# Patient Record
Sex: FEMALE | URBAN - METROPOLITAN AREA
[De-identification: names, ages, dates, MRNs, and addresses within clinical notes are randomized per-mention and may not be internally consistent; named-entity substitution may affect disease eponyms.]

---

## 2019-10-10 ENCOUNTER — HOSPITAL ENCOUNTER (OUTPATIENT)
Dept: LAB | Age: 69
Discharge: HOME | End: 2019-10-10

## 2019-10-10 PROCEDURE — 88312 SPECIAL STAINS GROUP 1: CPT

## 2019-10-10 PROCEDURE — 88305 TISSUE EXAM BY PATHOLOGIST: CPT

## 2021-08-28 ENCOUNTER — TELEPHONE ENCOUNTER (OUTPATIENT)
Dept: URBAN - METROPOLITAN AREA CLINIC 13 | Facility: CLINIC | Age: 71
End: 2021-08-28

## 2021-08-29 ENCOUNTER — TELEPHONE ENCOUNTER (OUTPATIENT)
Dept: URBAN - METROPOLITAN AREA CLINIC 13 | Facility: CLINIC | Age: 71
End: 2021-08-29

## 2024-05-31 ENCOUNTER — TELEPHONE (OUTPATIENT)
Dept: ORTHOPEDIC SURGERY | Age: 74
End: 2024-05-31

## 2024-05-31 NOTE — TELEPHONE ENCOUNTER
Can  MEF see this  pt  next week?  She is  getting  her  CT  from  [facility]     She   fell on  5/22 and  has  a shattered  elbow   and  a tear and has been told  she  is  surgical   Also  she has  a forearm fx     I  grabbed  a spot  w/ fan  later in  June  and  she needs  to  get  her  CT   But  can  Friend  see  her  Tuesday or  Thursday  of  next week?

## 2024-06-04 ENCOUNTER — OFFICE VISIT (OUTPATIENT)
Age: 74
End: 2024-06-04
Payer: COMMERCIAL

## 2024-06-04 VITALS — WEIGHT: 157 LBS | BODY MASS INDEX: 24.64 KG/M2 | HEIGHT: 67 IN

## 2024-06-04 DIAGNOSIS — S52.122A CLOSED DISPLACED FRACTURE OF HEAD OF LEFT RADIUS, INITIAL ENCOUNTER: ICD-10-CM

## 2024-06-04 DIAGNOSIS — S42.452A CLOSED FRACTURE OF CAPITULUM OF LEFT HUMERUS, INITIAL ENCOUNTER: Primary | ICD-10-CM

## 2024-06-04 PROCEDURE — 99205 OFFICE O/P NEW HI 60 MIN: CPT | Performed by: ORTHOPAEDIC SURGERY

## 2024-06-04 PROCEDURE — 1123F ACP DISCUSS/DSCN MKR DOCD: CPT | Performed by: ORTHOPAEDIC SURGERY

## 2024-06-04 NOTE — H&P (VIEW-ONLY)
Orthopaedic Hand Clinic Note    Name: Yany Mcdonough  YOB: 1950  Gender: female  MRN: [id number]      CC: Patient referred for evaluation of upper extremity pain    HPI: Yany Mcdonough is a 73 y.o. female with a chief complaint of left elbow injury which occurred on 5/22/24 as a result of a fall. [facility] were obtained, and she was placed in a splint. She followed up with another orthopedist, who referred her here for further care. .      ROS/Meds/PSH/PMH/FH/SH: I personally reviewed the patients standard intake form.  Pertinents are discussed in the HPI    Physical Examination:    Musculoskeletal Exam:  Examination on the left upper extremity demonstrates cap refill < 5 seconds in all fingers, there is swelling, ecchymosis throughout the left arm extending to the hand, there is tenderness at the elbow. Elbow motion was not assessed. Finger range of motion is limited. Light touch sensation is intact throughout.    Imaging / Electrodiagnostic Tests:     I independently reviewed and interpreted left elbow radiographs.  They demonstrate displaced capitellar and radial head fractures, as well as a small fracture of the coronoid tip    I independently reviewed and interpreted left elbow CT.  They demonstrate again displaced and comminuted capitellar fracture, which extends into the trochlea.  There is a displaced fracture of the radial head, which appears to involve a fairly small portion of the articular surface. There is a coronoid tip fracture        Assessment:     ICD-10-CM    1. Closed fracture of capitulum of left humerus, initial encounter  S42.452A Case Request     Ambulatory referral to Occupational Therapy      2. Closed displaced fracture of head of left radius, initial encounter  S52.122A Case Request     Ambulatory referral to Occupational Therapy          Plan:   We discussed the diagnosis and different treatment options. We discussed observation, therapy, antiinflammatory

## 2024-06-04 NOTE — PROGRESS NOTES
Orthopaedic Hand Clinic Note    Name: Yany Mcdonough  YOB: 1950  Gender: female  MRN: [id number]      CC: Patient referred for evaluation of upper extremity pain    HPI: Yany Mcdonough is a 73 y.o. female with a chief complaint of left elbow injury which occurred on 5/22/24 as a result of a fall. [facility] were obtained, and she was placed in a splint. She followed up with another orthopedist, who referred her here for further care. .      ROS/Meds/PSH/PMH/FH/SH: I personally reviewed the patients standard intake form.  Pertinents are discussed in the HPI    Physical Examination:    Musculoskeletal Exam:  Examination on the left upper extremity demonstrates cap refill < 5 seconds in all fingers, there is swelling, ecchymosis throughout the left arm extending to the hand, there is tenderness at the elbow. Elbow motion was not assessed. Finger range of motion is limited. Light touch sensation is intact throughout.    Imaging / Electrodiagnostic Tests:     I independently reviewed and interpreted left elbow radiographs.  They demonstrate displaced capitellar and radial head fractures, as well as a small fracture of the coronoid tip    I independently reviewed and interpreted left elbow CT.  They demonstrate again displaced and comminuted capitellar fracture, which extends into the trochlea.  There is a displaced fracture of the radial head, which appears to involve a fairly small portion of the articular surface. There is a coronoid tip fracture        Assessment:     ICD-10-CM    1. Closed fracture of capitulum of left humerus, initial encounter  S42.452A Case Request     Ambulatory referral to Occupational Therapy      2. Closed displaced fracture of head of left radius, initial encounter  S52.122A Case Request     Ambulatory referral to Occupational Therapy          Plan:   We discussed the diagnosis and different treatment options. We discussed observation, therapy, antiinflammatory

## 2024-06-05 ENCOUNTER — TELEPHONE (OUTPATIENT)
Dept: ORTHOPEDIC SURGERY | Age: 74
End: 2024-06-05

## 2024-06-05 PROBLEM — S42.452A: Status: ACTIVE | Noted: 2024-06-04

## 2024-06-05 PROBLEM — S52.122A CLOSED FRACTURE OF HEAD OF LEFT RADIUS: Status: ACTIVE | Noted: 2024-06-04

## 2024-06-05 NOTE — TELEPHONE ENCOUNTER
She was seen yesterday and set up surgery for Friday. She has 2 quick questions. This was a voicemail.

## 2024-06-06 RX ORDER — ZINC SULFATE 50(220)MG
220 CAPSULE ORAL DAILY
COMMUNITY
End: 2024-06-06

## 2024-06-06 RX ORDER — LISINOPRIL 20 MG/1
1 TABLET ORAL DAILY
COMMUNITY
Start: 2019-12-13

## 2024-06-06 RX ORDER — TIOTROPIUM BROMIDE 18 UG/1
18 CAPSULE ORAL; RESPIRATORY (INHALATION) DAILY
COMMUNITY
Start: 2022-02-10

## 2024-06-06 RX ORDER — MELOXICAM 7.5 MG/1
7.5 TABLET ORAL PRN
COMMUNITY

## 2024-06-06 RX ORDER — CETIRIZINE HYDROCHLORIDE 10 MG/1
1 TABLET ORAL DAILY PRN
COMMUNITY

## 2024-06-06 NOTE — PERIOP NOTE
Preop department called to notify patient of arrival time for scheduled procedure. Instructions given to   - Arrive at [facility] Entr[facility] [address].  - Remain NPO after midnight, unless otherwise indicated, including gum, mints, and ice chips.   - Have a responsible adult to drive patient to the hospital, stay during surgery, and patient will need supervision 24 hours after anesthesia.   - Use antibacterial soap in shower the night before surgery and on the morning of surgery.       Was patient contacted: yes  Voicemail left:   Numbers contacted: [phone]   Arrival time: 0930

## 2024-06-06 NOTE — PERIOP NOTE
Phone pre-assessment completed.    Verified name & . Order to obtain consent not found in EHR, however patient verifies case posting.    Type 1B surgery,  assessment complete.  Orders not received.    Labs per surgeon: unknown  Labs per anesthesia protocol: none    Patient answered medical/surgical history questions at their best of ability. All prior to admission medications documented in EPIC.    Patient instructed to continue all prescribed medications unless otherwise instructed below:    Prescription meds to hold: meloxicam- hold immediately.     Please stop all vitamins & supplements 7 days prior to surgery and stop all NSAIDS (ASA/Excedrin/BC & Goody Powder, ibuprofen/Motrin/Advil, naproxen/Aleve) 5 days before your surgery. Should you have a surgery date that does not allow for the amount of time instructed above, please stop taking vitamins, supplements, and NSAIDS IMMEDIATELY.    Patient instructed to take ONLY the following medications day of surgery per anesthesia guidelines with sip of water: none. Please use Spiriva inhaler and bring albuterol inhaler to the hospital.Continue all prescription medication the day/night prior to surgery unless other wise instructed above.    If you have never been diagnosed with liver disease, take Acetaminophen 1000mg in the morning and then again before bed one day prior to surgery date. You may substitute Tylenol Regular Strength.    Instructed on the following:    > Arrive at [address] [place] [address] ([address])[place] [address]   Entrance, time of arrival to be called the day before by 1700  > NPO after midnight including gum, mints, and ice chips & NO TOBACCO  > Responsible adult must drive patient to the hospital, stay during surgery, and patient will need supervision 24 hours after anesthesia  > Use antibacterial soap in shower the night before surgery and on the morning of surgery  > All piercings must be removed prior to arrival.    > Leave all valuables (money

## 2024-06-07 ENCOUNTER — ANESTHESIA (OUTPATIENT)
Dept: SURGERY | Age: 74
End: 2024-06-07
Payer: MEDICARE

## 2024-06-07 ENCOUNTER — HOSPITAL ENCOUNTER (OUTPATIENT)
Age: 74
Setting detail: OUTPATIENT SURGERY
Discharge: HOME | End: 2024-06-07
Attending: ORTHOPAEDIC SURGERY | Admitting: ORTHOPAEDIC SURGERY
Payer: MEDICARE

## 2024-06-07 ENCOUNTER — ANESTHESIA EVENT (OUTPATIENT)
Dept: SURGERY | Age: 74
End: 2024-06-07
Payer: MEDICARE

## 2024-06-07 ENCOUNTER — APPOINTMENT (OUTPATIENT)
Dept: GENERAL RADIOLOGY | Age: 74
End: 2024-06-07
Attending: ORTHOPAEDIC SURGERY
Payer: MEDICARE

## 2024-06-07 VITALS
DIASTOLIC BLOOD PRESSURE: 77 MMHG | TEMPERATURE: 97.9 F | WEIGHT: 157 LBS | OXYGEN SATURATION: 93 % | HEIGHT: 67 IN | SYSTOLIC BLOOD PRESSURE: 147 MMHG | BODY MASS INDEX: 24.64 KG/M2 | HEART RATE: 68 BPM | RESPIRATION RATE: 14 BRPM

## 2024-06-07 DIAGNOSIS — S52.122A CLOSED DISPLACED FRACTURE OF HEAD OF LEFT RADIUS, INITIAL ENCOUNTER: ICD-10-CM

## 2024-06-07 DIAGNOSIS — S42.452A CLOSED DISPLACED FRACTURE OF LATERAL CONDYLE OF LEFT HUMERUS, INITIAL ENCOUNTER: Primary | ICD-10-CM

## 2024-06-07 PROCEDURE — 24665 OPTX RADIAL HEAD/NECK FX: CPT | Performed by: ORTHOPAEDIC SURGERY

## 2024-06-07 PROCEDURE — 3600000014 HC SURGERY LEVEL 4 ADDTL 15MIN: Performed by: ORTHOPAEDIC SURGERY

## 2024-06-07 PROCEDURE — 6360000002 HC RX W HCPCS: Performed by: ORTHOPAEDIC SURGERY

## 2024-06-07 PROCEDURE — 7100000000 HC PACU RECOVERY - FIRST 15 MIN: Performed by: ORTHOPAEDIC SURGERY

## 2024-06-07 PROCEDURE — 24579 OPTX HUMRL CNDYLR FRACTURE: CPT | Performed by: ORTHOPAEDIC SURGERY

## 2024-06-07 PROCEDURE — 3700000001 HC ADD 15 MINUTES (ANESTHESIA): Performed by: ORTHOPAEDIC SURGERY

## 2024-06-07 PROCEDURE — C1769 GUIDE WIRE: HCPCS | Performed by: ORTHOPAEDIC SURGERY

## 2024-06-07 PROCEDURE — 76942 ECHO GUIDE FOR BIOPSY: CPT | Performed by: ANESTHESIOLOGY

## 2024-06-07 PROCEDURE — 3600000004 HC SURGERY LEVEL 4 BASE: Performed by: ORTHOPAEDIC SURGERY

## 2024-06-07 PROCEDURE — 6360000002 HC RX W HCPCS: Performed by: ANESTHESIOLOGY

## 2024-06-07 PROCEDURE — 2580000003 HC RX 258: Performed by: ANESTHESIOLOGY

## 2024-06-07 PROCEDURE — 2709999900 HC NON-CHARGEABLE SUPPLY: Performed by: ORTHOPAEDIC SURGERY

## 2024-06-07 PROCEDURE — C1713 ANCHOR/SCREW BN/BN,TIS/BN: HCPCS | Performed by: ORTHOPAEDIC SURGERY

## 2024-06-07 PROCEDURE — 7100000010 HC PHASE II RECOVERY - FIRST 15 MIN: Performed by: ORTHOPAEDIC SURGERY

## 2024-06-07 PROCEDURE — 2720000010 HC SURG SUPPLY STERILE: Performed by: ORTHOPAEDIC SURGERY

## 2024-06-07 PROCEDURE — 7100000001 HC PACU RECOVERY - ADDTL 15 MIN: Performed by: ORTHOPAEDIC SURGERY

## 2024-06-07 PROCEDURE — 6360000002 HC RX W HCPCS: Performed by: NURSE ANESTHETIST, CERTIFIED REGISTERED

## 2024-06-07 PROCEDURE — 3700000000 HC ANESTHESIA ATTENDED CARE: Performed by: ORTHOPAEDIC SURGERY

## 2024-06-07 DEVICE — IMPLANTABLE DEVICE: Type: IMPLANTABLE DEVICE | Site: ARM | Status: FUNCTIONAL

## 2024-06-07 RX ORDER — HYDROMORPHONE HYDROCHLORIDE 2 MG/ML
0.5 INJECTION, SOLUTION INTRAMUSCULAR; INTRAVENOUS; SUBCUTANEOUS EVERY 5 MIN PRN
Status: DISCONTINUED | OUTPATIENT
Start: 2024-06-07 | End: 2024-06-07 | Stop reason: HOSPADM

## 2024-06-07 RX ORDER — FENTANYL CITRATE 50 UG/ML
100 INJECTION, SOLUTION INTRAMUSCULAR; INTRAVENOUS
Status: COMPLETED | OUTPATIENT
Start: 2024-06-07 | End: 2024-06-07

## 2024-06-07 RX ORDER — PROPOFOL 10 MG/ML
INJECTION, EMULSION INTRAVENOUS PRN
Status: DISCONTINUED | OUTPATIENT
Start: 2024-06-07 | End: 2024-06-07 | Stop reason: SDUPTHER

## 2024-06-07 RX ORDER — SODIUM CHLORIDE 0.9 % (FLUSH) 0.9 %
5-40 SYRINGE (ML) INJECTION EVERY 12 HOURS SCHEDULED
Status: DISCONTINUED | OUTPATIENT
Start: 2024-06-07 | End: 2024-06-07 | Stop reason: HOSPADM

## 2024-06-07 RX ORDER — NALOXONE HYDROCHLORIDE 0.4 MG/ML
INJECTION, SOLUTION INTRAMUSCULAR; INTRAVENOUS; SUBCUTANEOUS PRN
Status: DISCONTINUED | OUTPATIENT
Start: 2024-06-07 | End: 2024-06-07 | Stop reason: HOSPADM

## 2024-06-07 RX ORDER — MIDAZOLAM HYDROCHLORIDE 5 MG/5ML
4 INJECTION, SOLUTION INTRAMUSCULAR; INTRAVENOUS
Status: COMPLETED | OUTPATIENT
Start: 2024-06-07 | End: 2024-06-07

## 2024-06-07 RX ORDER — SODIUM CHLORIDE 0.9 % (FLUSH) 0.9 %
5-40 SYRINGE (ML) INJECTION PRN
Status: DISCONTINUED | OUTPATIENT
Start: 2024-06-07 | End: 2024-06-07 | Stop reason: HOSPADM

## 2024-06-07 RX ORDER — DEXAMETHASONE SODIUM PHOSPHATE 4 MG/ML
INJECTION, SOLUTION INTRA-ARTICULAR; INTRALESIONAL; INTRAMUSCULAR; INTRAVENOUS; SOFT TISSUE
Status: COMPLETED | OUTPATIENT
Start: 2024-06-07 | End: 2024-06-07

## 2024-06-07 RX ORDER — OXYCODONE HYDROCHLORIDE 5 MG/1
5 TABLET ORAL
Status: DISCONTINUED | OUTPATIENT
Start: 2024-06-07 | End: 2024-06-07 | Stop reason: HOSPADM

## 2024-06-07 RX ORDER — SODIUM CHLORIDE 9 MG/ML
INJECTION, SOLUTION INTRAVENOUS PRN
Status: DISCONTINUED | OUTPATIENT
Start: 2024-06-07 | End: 2024-06-07 | Stop reason: HOSPADM

## 2024-06-07 RX ORDER — OXYCODONE HYDROCHLORIDE AND ACETAMINOPHEN 5; 325 MG/1; MG/1
1 TABLET ORAL EVERY 4 HOURS PRN
Qty: 30 TABLET | Refills: 0 | Status: SHIPPED | OUTPATIENT
Start: 2024-06-07 | End: 2024-06-14

## 2024-06-07 RX ORDER — LIDOCAINE HYDROCHLORIDE 10 MG/ML
1 INJECTION, SOLUTION INFILTRATION; PERINEURAL
Status: DISCONTINUED | OUTPATIENT
Start: 2024-06-07 | End: 2024-06-07 | Stop reason: HOSPADM

## 2024-06-07 RX ORDER — SODIUM CHLORIDE, SODIUM LACTATE, POTASSIUM CHLORIDE, CALCIUM CHLORIDE 600; 310; 30; 20 MG/100ML; MG/100ML; MG/100ML; MG/100ML
INJECTION, SOLUTION INTRAVENOUS CONTINUOUS
Status: DISCONTINUED | OUTPATIENT
Start: 2024-06-07 | End: 2024-06-07 | Stop reason: HOSPADM

## 2024-06-07 RX ORDER — ONDANSETRON 2 MG/ML
4 INJECTION INTRAMUSCULAR; INTRAVENOUS
Status: DISCONTINUED | OUTPATIENT
Start: 2024-06-07 | End: 2024-06-07 | Stop reason: HOSPADM

## 2024-06-07 RX ADMIN — SODIUM CHLORIDE, SODIUM LACTATE, POTASSIUM CHLORIDE, AND CALCIUM CHLORIDE: 600; 310; 30; 20 INJECTION, SOLUTION INTRAVENOUS at 15:07

## 2024-06-07 RX ADMIN — EPINEPHRINE 15 ML: 1 INJECTION, SOLUTION, CONCENTRATE INTRAVENOUS at 11:23

## 2024-06-07 RX ADMIN — SODIUM CHLORIDE, SODIUM LACTATE, POTASSIUM CHLORIDE, AND CALCIUM CHLORIDE: 600; 310; 30; 20 INJECTION, SOLUTION INTRAVENOUS at 10:30

## 2024-06-07 RX ADMIN — PROPOFOL 25 MG: 10 INJECTION, EMULSION INTRAVENOUS at 13:23

## 2024-06-07 RX ADMIN — Medication 2000 MG: at 13:16

## 2024-06-07 RX ADMIN — PROPOFOL 15 MG: 10 INJECTION, EMULSION INTRAVENOUS at 13:25

## 2024-06-07 RX ADMIN — MIDAZOLAM 3 MG: 1 INJECTION INTRAMUSCULAR; INTRAVENOUS at 11:23

## 2024-06-07 RX ADMIN — FENTANYL CITRATE 50 MCG: 50 INJECTION INTRAMUSCULAR; INTRAVENOUS at 11:23

## 2024-06-07 RX ADMIN — ROPIVACAINE HYDROCHLORIDE 15 ML: 10 INJECTION, SOLUTION EPIDURAL at 11:23

## 2024-06-07 RX ADMIN — DEXAMETHASONE SODIUM PHOSPHATE 4 MG: 4 INJECTION, SOLUTION INTRAMUSCULAR; INTRAVENOUS at 11:23

## 2024-06-07 RX ADMIN — PROPOFOL 100 MCG/KG/MIN: 10 INJECTION, EMULSION INTRAVENOUS at 13:27

## 2024-06-07 ASSESSMENT — PAIN - FUNCTIONAL ASSESSMENT: PAIN_FUNCTIONAL_ASSESSMENT: 0-10

## 2024-06-07 NOTE — ANESTHESIA PRE PROCEDURE
Department of Anesthesiology  Preprocedure Note       Name:  Yany Mcdonough   Age:  73 y.o.  :  1950                                          MRN:  [id number]         Date:  2024      Surgeon: Surgeon(s):  Kamlesh, Johanna CHENG MD    Procedure: Procedure(s):  left distal HUMERUS and left radial head OPEN REDUCTION INTERNAL FIXATION    Medications prior to admission:   Prior to Admission medications    Medication Sig Start Date End Date Taking? Authorizing Provider   tiotropium (SPIRIVA HANDIHALER) 18 MCG inhalation capsule Inhale 1 capsule into the lungs daily 2/10/22  Yes Ruby Andrew MD   lisinopril (PRINIVIL;ZESTRIL) 20 MG tablet Take 1 tablet by mouth daily 19  Yes Ruby Andrew MD   albuterol sulfate (PROAIR RESPICLICK) 108 (90 Base) MCG/ACT aerosol powder inhalation Inhale 2 puffs into the lungs every 6 hours as needed for Wheezing or Shortness of Breath 17  Yes Ruby Andrew MD   ZINC PO Take by mouth daily   Yes Ruby Andrew MD   Omega-3 Fatty Acids (OMEGA 3 PO) Take 1 tablet by mouth in the morning and at bedtime   Yes Ruby Andrew MD   CHOLINE PO Take 1 tablet by mouth in the morning and at bedtime   Yes Ruby Andrew MD   Vitamin D-Vitamin K (K2 PLUS D3 PO) Take 1 tablet by mouth daily   Yes Ruby Andrew MD   Nutritional Supplements (JUICE PLUS FIBRE PO) Take 1 tablet by mouth daily   Yes Ruby Andrew MD   alpha1-proteinase inhibitor, human, (PROTEINASE INHIBITOR) 1000 MG SOLR Infuse intravenously every 7 days 20  Yes Ruby Andrew MD   Taurine 1000 MG CAPS Take 1,000 mg by mouth daily    Ruby Andrew MD   meloxicam (MOBIC) 7.5 MG tablet Take 1 tablet by mouth as needed for Pain    Ruby Andrew MD   cetirizine (ZYRTEC ALLERGY) 10 MG tablet Take 1 tablet by mouth daily as needed    Ruby Andrew MD       Current medications:    Current Facility-Administered Medications   Medication Dose

## 2024-06-07 NOTE — OP NOTE
ORTHOPAEDIC SURGICAL NOTE        Yany Mcdonough female 73 y.o.  [id number]   06/07/24    PRE-OP DIAGNOSIS: left type IV capitellum fracture, left radial head fracture  POST-OP DIAGNOSIS: Same  LATERALITY: Left     PROCEDURES PERFORMED:   Open reduction with internal fixation of left capitellum fracture  Open reduction with internal fixation of left radial head fracture       SURGEON:   Johanna Whitlock MD     IMPLANTS:   Implant Name Type Inv. Item Serial No.  Lot No. LRB No. Used Action   SCREW BNE SHT THRD 2X22 MM 4 MM YVONNE HDLSS TI - [id number]  SCREW BNE SHT THRD 2X22 MM 4 MM YVONNE HDLSS TI  [employer] [id number] Left 1 Implanted   SCREW BNE SHT THRD 2.5X24 MM 6 MM YVONNE HDLSS TI - [id number]  SCREW BNE SHT THRD 2.5X24 MM 6 MM YVONNE HDLSS TI  [employer] [id number] Left 1 Implanted   SCREW BNE SHT THRD 2.5X28 MM 7 MM YVONNE HDLSS TI - [id number]  SCREW BNE SHT THRD 2.5X28 MM 7 MM YVONNE HDLSS TI  [employer] [id number] Left 2 Implanted   SCREW BNE SHT THRD 2.5X26 MM 7 MM YVONNE HDLSS TI - [id number]  SCREW BNE SHT THRD 2.5X26 MM 7 MM YVONNE HDLSS TI  [employer] [id number] Left 1 Implanted      Procedure(s):  left distal HUMERUS and left radial head OPEN REDUCTION INTERNAL FIXATION   Surgeon(s):  Johanna Whitlock MD   Procedure(s):  left distal HUMERUS and left radial head OPEN REDUCTION INTERNAL FIXATION     ANESTHESIA: Regional     STAFF:    Circulator: Anabel Cowan RN  Scrub Person First: Jessi Banegas     ESTIMATED BLOOD LOSS: Minimal       TOTAL IV FLUIDS : See anesthesia note    COMPLICATIONS: None     TOURNIQUET TIME:   Total Tourniquet Time Documented:  Arm  (Left) - 85 minutes  Total: Arm  (Left) - 85 minutes       INDICATION FOR PROCEDURE:     Yany Mcdonough sustained displaced fracture of the capitellum which extended into the trochlea, as well as a radial head fracture.  Surgical and non-surgical treatment options were discussed with the patient and their  family, as well as the risk and benefits of each option. After thorough discussion, the patient decided to proceed with surgical management.  Specific to this treatment plan, we discussed in detail surgical risks including scar, pain, bleeding, infection, anesthetic risks, neurovascular injury, failure to achieve desired results, hardware problems, need for further surgery,  weakness, stiffness, risk of death and potential risk of other unforseen complication.       The patient consented to the procedure after discussion of the risks and benefits.         DESCRIPTION OF PROCEDURE:     The patient was identified in the holding room. The left elbow was marked and confirmed as the correct operative site. They were then brought to the OR and transferred onto the OR table in the supine position. All bony prominences were well padded. SCDs were placed on the bilateral legs throughout the case. A timeout was performed, verifying the correct patient, the correct side  and the correct procedure. Antibiotics were then administered, and were redosed during the procedure as needed at indicated intervals.      A non-sterile tourniquet was placed on the arm.    The upper extremity was pre scrubbed and then prepped and draped in routine sterile fashion.      An incisional timeout was performed re-confirming the correct patient, surgical site and procedure, as well as verifying antibiotics.     The left upper extremity was exsanguinated and tourniquet was inflated to 250mmHg. An oblique incision was made over the lateral aspect of the elbow. Careful dissection was carried out through subcutaneous tissues. An ECD split approach was used to access the elbow joint. The fracture site was identified and debrided of hematoma. There were two large fracture fragments of the capitellum and trochlea, which had significantly displaced and rotated. These fracture fragments were removed, and reassembled with a guidewire for a Synthes 2.5mm

## 2024-06-07 NOTE — ANESTHESIA POSTPROCEDURE EVALUATION
Department of Anesthesiology  Postprocedure Note    Patient: Yany Mcdonough  MRN: [id number]  YOB: 1950  Date of evaluation: 6/7/2024    Procedure Summary       Date: 06/07/24 Room / Location: [facility] OP OR 02 / [facility]    Anesthesia Start: 1316 Anesthesia Stop: 1522    Procedure: left distal HUMERUS and left radial head OPEN REDUCTION INTERNAL FIXATION (Left) Diagnosis:       Closed fracture of lateral condyle of left elbow      Closed fracture of head of left radius      (Closed fracture of lateral condyle of left elbow [S42.452A])      (Closed fracture of head of left radius [S52.122A])    Surgeons: Johanna Whitlock MD Responsible Provider: Sam Ambriz MD    Anesthesia Type: TIVA ASA Status: 3            Anesthesia Type: TIVA    Jenna Phase I: Jenna Score: 10    Jenna Phase II:      Anesthesia Post Evaluation    Patient location during evaluation: PACU  Patient participation: complete - patient participated  Level of consciousness: awake  Airway patency: patent  Nausea & Vomiting: no nausea  Cardiovascular status: hemodynamically stable  Respiratory status: acceptable and nonlabored ventilation  Hydration status: stable  Multimodal analgesia pain management approach  Pain management: adequate    No notable events documented.

## 2024-06-07 NOTE — ANESTHESIA PROCEDURE NOTES
Peripheral Block    Patient location during procedure: procedure area  Reason for block: post-op pain management and at surgeon's request  Start time: 6/7/2024 11:23 AM  End time: 6/7/2024 11:29 AM  Staffing  Performed: anesthesiologist   Anesthesiologist: Yonatan Crystal Jr., MD  Performed by: Yonatan Crystal Jr., MD  Authorized by: Yonatan Crystal Jr., MD    Preanesthetic Checklist  Completed: patient identified, IV checked, site marked, risks and benefits discussed, surgical/procedural consents, equipment checked, pre-op evaluation, timeout performed, anesthesia consent given, oxygen available, monitors applied/VS acknowledged, fire risk safety assessment completed and verbalized and blood product R/B/A discussed and consented  Peripheral Block   Patient position: supine  Prep: ChloraPrep  Provider prep: mask and sterile gloves  Patient monitoring: cardiac monitor, continuous pulse ox, frequent blood pressure checks, IV access, oxygen and responsive to questions  Block type: Suprascapular  Laterality: left  Injection technique: single-shot  Guidance: nerve stimulator and ultrasound guided    Needle   Needle type: insulated echogenic nerve stimulator needle   Needle gauge: 20 G  Needle localization: nerve stimulator and ultrasound guidance  Needle insertion depth: 3 cm  Needle length: 5 cm  Assessment   Injection assessment: negative aspiration for heme, no paresthesia on injection, local visualized surrounding nerve on ultrasound and no intravascular symptoms  Paresthesia pain: none  Slow fractionated injection: yes  Hemodynamics: stable  Outcomes: uncomplicated and patient tolerated procedure well    Additional Notes  Potential access sites were examined with ultrasound and the acceptable patent access site was selected (site noted above).  The needle path and vein access were visualized in real time using ultrasonography, and an image was recorded for permanent record.     15 cc 1% Ropivacaine + 1.5%

## 2024-06-07 NOTE — DISCHARGE INSTRUCTIONS
Postoperative  Instructions:      Weightbearing or Lifting:  You  are  not  allowed  to  lift  any  weight  or  bear  any  weight  on  the  surgical  extremity  until  cleared  by  your  surgeon.      Dressing  instructions:    Keep  your  dressing  and/or  splint  clean  and  dry  at  all  times.    It  will  be  removed  at  your  first  post-operative  appointment or therapy apppointment.    Your  stitches  will  be  removed  at  this  visit.      Showering  Instructions:  May  shower  But keep surgical dressing clean and dry until removed as explained above.      Pain  Control:  - You  have  been  given  a  prescription  to  be  taken  as  directed  for  post-operative  pain  control.    In  addition,  elevate  the  operative  extremity  above  the  heart  at  all  times  to  prevent  swelling  and  throbbing  pain.   - If you develop constipation while taking narcotic pain medications (Norco, Hydrocodone, Percocet, Oxycodone, Dilaudid, Hydromorphone) take  over-the-counter  Colace,  100mg  by  mouth  twice  a  Day.     - Nausea  is  a  common  side  effect  of  many  pain  medications.  You  will  want  to  eat something  before  taking  your  pain  medicine  to  help  prevent  Nausea.  - If  you  are  taking  a  prescription  pain  medication  that  contains  acetaminophen,  we  recommend  that  you  do  not  take  additional  over  the  counter  acetaminophen  (Tylenol®).      Other  pain  relieving  options:   - Using  a  cold  pack  to  ice  the  affected  area  a  few  times  a  day  (15  to  20  minutes  at  a  time)  can  help  to  relieve  pain,  reduce  swelling  and  bruising.      - Elevation  of  the  affected  area  can  also  help  to  reduce  pain  and  swelling.      Did  you  receive  a  nerve  Block?  A  nerve  block  can  provide  pain  relief  for  one  hour  to  two  days  after  your  surgery.  As  long  as  the  nerve  block  is  working,  you  will  experience  little  or  no   doctor.     PAIN  Take pain medication as directed by your doctor.   Call your doctor if pain is NOT relieved by medication.   DO NOT take aspirin of blood thinners unless directed by your doctor.     MEDICATION INTERACTION:During your procedure you potentially received a medication or medications which may reduce the effectiveness of oral contraceptives. Please consider other forms of contraception for 1 month following your procedure if you are currently using oral contraceptives as your primary form of birth control. In addition to this, we recommend continuing your oral contraceptive as prescribed, unless otherwise instructed by your physician, during this time      CALL YOUR DOCTOR IF   Excessive bleeding that does not stop after holding pressure over the area  Temperature of 101 degrees F or above  Excessive redness, swelling or bruising, and/ or green or yellow, smelly discharge from incision    After general anesthesia or intravenous sedation, for 24 hours or while taking prescription Narcotics:  Limit your activities  A responsible adult needs to be with you for the next 24 hours  Do not drive and operate hazardous machinery  Do not make important personal or business decisions  Do not drink alcoholic beverages  If you have not urinated within 8 hours after discharge, and you are experiencing discomfort from urinary retention, please go to the nearest ED.  If you have sleep apnea and have a CPAP machine, please use it for all naps and sleeping.  Please use caution when taking narcotics and any of your home medications that may cause drowsiness.  *  Please give a list of your current medications to your Primary Care Provider.  *  Please update this list whenever your medications are discontinued, doses are      changed, or new medications (including over-the-counter products) are added.  *  Please carry medication information at all times in case of emergency situations.    These are general instructions for

## 2024-06-14 ENCOUNTER — EVALUATION (OUTPATIENT)
Age: 74
End: 2024-06-14
Payer: MEDICARE

## 2024-06-14 DIAGNOSIS — S52.122A CLOSED DISPLACED FRACTURE OF HEAD OF LEFT RADIUS, INITIAL ENCOUNTER: ICD-10-CM

## 2024-06-14 DIAGNOSIS — S42.452D CLOSED DISPLACED FRACTURE OF LATERAL CONDYLE OF LEFT HUMERUS WITH ROUTINE HEALING, SUBSEQUENT ENCOUNTER: Primary | ICD-10-CM

## 2024-06-14 DIAGNOSIS — S42.452A CLOSED FRACTURE OF CAPITULUM OF LEFT HUMERUS, INITIAL ENCOUNTER: ICD-10-CM

## 2024-06-14 DIAGNOSIS — M25.622 STIFFNESS OF LEFT ELBOW JOINT: ICD-10-CM

## 2024-06-14 PROCEDURE — L3763 EWHO RIGID W/O JNTS CF: HCPCS | Performed by: OCCUPATIONAL THERAPIST

## 2024-06-14 NOTE — PROGRESS NOTES
[facility] ORTHOPAEDICS  [address] [place] [id number]  Dept: [phone]   Occupational Therapy Orthosis Note     Referring MD: FriendJohanna MD  Date: 6/16/2024  Diagnosis:    Diagnosis Orders   1. Closed displaced fracture of lateral condyle of left humerus with routine healing, subsequent encounter        2. Stiffness of left elbow joint           Therapy Precautions: Fracture precautions    Payor: Payor: [facility] MEDICARE /  /  /  Billing pattern: Government- total time   Total Timed Codes: 0 min, Total Treatment Time: 45 min  Modifier needed: No  Episode visit count:  1     PMH:   Affected Extremity: left    Date of Injury: 5/22/24    Date of Surgery: Surgery: ORIF left distal humerus and left radial head fx DOS 6/7/2024      Mechanism of Injury: Fall    Wound/Pin/Incision: Steristrips intact no sign of infection    ORTHOSIS ISSUED:   : EWHO (elbow wrist hand orthosis), C/F (custom fabricated -  without joints, may include soft interface, straps, includes fitting and adjustment.     In position of comfort long arm splint    TREATMENT PROVIDED:   Patient instructed in purpose, care, and precaution of orthosis wearing, Patient instructed in wearing schedule, and Patient instructed in HEP  Access Code: [email or web address]  URL: [email or web address]  Date: 06/14/2024  Prepared by: Nu Robert    Exercises  - Seated Shoulder Flexion Towel Slide at Table Top  - 3 x daily - 7 x weekly - 2-3 sets - 15 reps - 3 sec hold  - Seated Forearm Pronation Supination AROM  - 3 x daily - 7 x weekly - 2-3 sets - 15 reps - 3 sec hold  - Wrist Flexion Extension AROM with Fingers Curled and Palm Down  - 3 x daily - 7 x weekly - 2-3 sets - 15 reps - 3 sec hold  - Seated Full Fist AROM  - 3 x daily - 7 x weekly - 2-3 sets - 15 reps - 3 sec hold  - Seated Composite Thumb Flexion AROM  - 3 x daily - 7 x weekly - 2-3 sets - 15 reps - 3 sec hold          WEAR SCHEDULE:   remove for exercise, dressing

## 2024-06-17 ENCOUNTER — TELEPHONE (OUTPATIENT)
Dept: ORTHOPEDIC SURGERY | Age: 74
End: 2024-06-17

## 2024-06-17 NOTE — TELEPHONE ENCOUNTER
She was told to find a physical therapist in [place]. She would like to try [facility], [address] [facility].  Phone # [phone].  Can you please fax a RX to them for PT. She will be seeing Len Eli.

## 2024-06-18 NOTE — TELEPHONE ENCOUNTER
I spoke with Mrs. Mcdonough to let her know that I received her message.  I told her Dr. Friend wants to see her for her post-op appt before sending in the referral. She voiced understanding.

## 2024-06-20 ENCOUNTER — OFFICE VISIT (OUTPATIENT)
Age: 74
End: 2024-06-20

## 2024-06-20 ENCOUNTER — EVALUATION (OUTPATIENT)
Age: 74
End: 2024-06-20

## 2024-06-20 DIAGNOSIS — S42.452A CLOSED FRACTURE OF CAPITULUM OF LEFT HUMERUS, INITIAL ENCOUNTER: Primary | ICD-10-CM

## 2024-06-20 DIAGNOSIS — S52.122A CLOSED DISPLACED FRACTURE OF HEAD OF LEFT RADIUS, INITIAL ENCOUNTER: ICD-10-CM

## 2024-06-20 DIAGNOSIS — M25.622 STIFFNESS OF LEFT ELBOW JOINT: ICD-10-CM

## 2024-06-20 DIAGNOSIS — S42.452D CLOSED DISPLACED FRACTURE OF LATERAL CONDYLE OF LEFT HUMERUS WITH ROUTINE HEALING, SUBSEQUENT ENCOUNTER: Primary | ICD-10-CM

## 2024-06-20 PROCEDURE — 99024 POSTOP FOLLOW-UP VISIT: CPT | Performed by: ORTHOPAEDIC SURGERY

## 2024-06-20 NOTE — PROGRESS NOTES
Orthopaedic Hand Surgery Note    Name: Yany Mcdonough  YOB: 1950  Gender: female  MRN: [id number]    Post Operative Visit: left distal HUMERUS and left radial head OPEN REDUCTION INTERNAL FIXATION - Left    HPI: Patient is status post left distal HUMERUS and left radial head OPEN REDUCTION INTERNAL FIXATION - Left on 6/7/2024. Patient reports well controlled pain.    Physical Examination:  Surgical incision is clean, dry and intact.  Sutures are in place.  There is no erythema or drainage. Sensation is intact in all fingers. Motor exam reveals no deficits.  She is able to fully flex and extend all digits.  Elbow range of motion is quite good, she has a lack of extension of about 30 degrees, and flexion to 110 degrees.  She is able to perform 70 degrees of supination and pronation.    Imaging:     Elbow  XR: AP, Lateral, Oblique views     Clinical Indication:    ICD-10-CM    1. Closed fracture of capitulum of left humerus, initial encounter  S42.452A XR ELBOW LEFT (MIN 3 VIEWS)     Amb External Referral To Occupational Therapy      2. Closed displaced fracture of head of left radius, initial encounter  S52.122A XR ELBOW LEFT (MIN 3 VIEWS)     Amb External Referral To Occupational Therapy             Report: AP, lateral, oblique x-ray of the left elbow demonstrates status post headless compression screw osteosynthesis of left capitellar and radial head fractures, no change in alignment or hardware complication    Impression: as above     Johanna Whitlock MD         Assessment:   1. Closed fracture of capitulum of left humerus, initial encounter    2. Closed displaced fracture of head of left radius, initial encounter         Status post left distal HUMERUS and left radial head OPEN REDUCTION INTERNAL FIXATION - Left on 6/7/2024    Plan:  We discussed the post operative course and progression.  She has already seen our hand therapist and has been given a custom molded elbow orthosis.  She should remain

## 2024-06-21 NOTE — PROGRESS NOTES
Occupational Therapy Encounter No Charge    Patient seen briefly while in clinic this date for current presentation of L elbow fractures .      New strap and velcro added to existing orthosis.  No orthosis adjustments needed this date.  She has therapy set up to start tomorrow close to home with SARBJIT DIAST.      Precautions of current conditions and prognosis were also reviewed with the patient this date AT LENGTH.  Patient in agreement with therapist recommendations of promoting healing and ROM at this stage.  Verbal understanding that strengthening will not advance until 6 weeks post op.  Patient instructed to reach out with any needs from this facility.

## 2024-07-18 ENCOUNTER — OFFICE VISIT (OUTPATIENT)
Age: 74
End: 2024-07-18

## 2024-07-18 DIAGNOSIS — S42.452A CLOSED FRACTURE OF CAPITULUM OF LEFT HUMERUS, INITIAL ENCOUNTER: Primary | ICD-10-CM

## 2024-07-18 DIAGNOSIS — S52.122A CLOSED DISPLACED FRACTURE OF HEAD OF LEFT RADIUS, INITIAL ENCOUNTER: ICD-10-CM

## 2024-07-18 PROCEDURE — 99024 POSTOP FOLLOW-UP VISIT: CPT | Performed by: ORTHOPAEDIC SURGERY

## 2024-07-18 NOTE — PROGRESS NOTES
Orthopaedic Hand Surgery Note    Name: Yany Mcdonough  YOB: 1950  Gender: female  MRN: [id number]    Post Operative Visit: left distal HUMERUS and left radial head OPEN REDUCTION INTERNAL FIXATION - Left    HPI: Patient is status post left distal HUMERUS and left radial head OPEN REDUCTION INTERNAL FIXATION - Left on 6/7/2024. Patient reports well controlled pain.    Physical Examination:  Surgical incision is well healed. Sensation is intact in all fingers. Motor exam reveals no deficits.  She is able to fully flex and extend all digits.  Elbow range of motion still limited she has a lack of extension of about 45 degrees, and flexion to 130 degrees.  She is able to perform 70 degrees of supination and pronation.    Imaging:     Elbow  XR: AP, Lateral, Oblique views     Clinical Indication:    ICD-10-CM    1. Closed fracture of capitulum of left humerus, initial encounter  S42.452A XR ELBOW LEFT (MIN 3 VIEWS)      2. Closed displaced fracture of head of left radius, initial encounter  S52.122A XR ELBOW LEFT (MIN 3 VIEWS)             Report: AP, lateral, oblique x-ray of the left elbow demonstrates status post headless compression screw osteosynthesis of left capitellar and radial head fractures, no change in alignment or hardware complication    Impression: as above     Johanna Whitlock MD         Assessment:   1. Closed fracture of capitulum of left humerus, initial encounter    2. Closed displaced fracture of head of left radius, initial encounter         Status post left distal HUMERUS and left radial head OPEN REDUCTION INTERNAL FIXATION - Left on 6/7/2024    Plan:  We discussed the post operative course and progression.  She has been working with an OT. I have not received any notes from them. The patient states that the therapists wants to modify her current splint to aid elbow extension for her use at night, which I would approve. She can continue active and passive range of motion as tolerated

## 2024-08-29 ENCOUNTER — OFFICE VISIT (OUTPATIENT)
Age: 74
End: 2024-08-29

## 2024-08-29 DIAGNOSIS — S42.452A CLOSED FRACTURE OF CAPITULUM OF LEFT HUMERUS, INITIAL ENCOUNTER: Primary | ICD-10-CM

## 2024-08-29 DIAGNOSIS — S52.122A CLOSED DISPLACED FRACTURE OF HEAD OF LEFT RADIUS, INITIAL ENCOUNTER: ICD-10-CM

## 2024-08-29 PROCEDURE — 99024 POSTOP FOLLOW-UP VISIT: CPT | Performed by: ORTHOPAEDIC SURGERY

## 2024-08-29 NOTE — PROGRESS NOTES
Orthopaedic Hand Surgery Note    Name: Yany Mcdonough  YOB: 1950  Gender: female  MRN: [id number]    Post Operative Visit: left distal HUMERUS and left radial head OPEN REDUCTION INTERNAL FIXATION - Left    HPI: Patient is status post left distal HUMERUS and left radial head OPEN REDUCTION INTERNAL FIXATION - Left on 6/7/2024. Patient reports well controlled pain.    Physical Examination:  Surgical incision is well healed. Sensation is intact in all fingers. Motor exam reveals no deficits.  She is able to fully flex and extend all digits.  Elbow range of motion still limited; she has a lack of extension of about 45 degrees, and flexion to 130 degrees.  She is able to perform 70 degrees of supination and pronation.    Imaging:     Elbow  XR: AP, Lateral, Oblique views     Clinical Indication:    ICD-10-CM    1. Closed fracture of capitulum of left humerus, initial encounter  S42.452A XR ELBOW LEFT (MIN 3 VIEWS)      2. Closed displaced fracture of head of left radius, initial encounter  S52.122A XR ELBOW LEFT (MIN 3 VIEWS)             Report: AP, lateral, oblique x-ray of the left elbow demonstrates status post headless compression screw osteosynthesis of left capitellar and radial head fractures, no change in alignment or hardware complication    Impression: as above     Johanna Whitlock MD         Assessment:   1. Closed fracture of capitulum of left humerus, initial encounter    2. Closed displaced fracture of head of left radius, initial encounter         Status post left distal HUMERUS and left radial head OPEN REDUCTION INTERNAL FIXATION - Left on 6/7/2024    Plan:  We discussed the post operative course and progression.  She has been working with an OT.  She said she just started strengthening recently.  She is essentially made no progress in her range of motion since I last saw her.  Upon further questioning, the patient did go out of town for 2 weeks, and only occasionally does home exercises.

## (undated) DEVICE — Device

## (undated) DEVICE — IMPLANTABLE DEVICE
Type: IMPLANTABLE DEVICE | Site: ARM | Status: NON-FUNCTIONAL
Removed: 2024-06-07

## (undated) DEVICE — GLOVE SURG SZ 65 THK91MIL LTX FREE SYN POLYISOPRENE

## (undated) DEVICE — SUTURE VICRYL + SZ 2-0 L27IN ABSRB UD CP-1 1/2 CIR REV CUT VCP266H

## (undated) DEVICE — C-ARM: Brand: UNBRANDED

## (undated) DEVICE — PADDING CAST COHESIVE 4 YDX3 IN HND TEARABLE COTTON SPEC 100

## (undated) DEVICE — HAND PACK: Brand: MEDLINE INDUSTRIES, INC.

## (undated) DEVICE — GLOVE SURG SZ 65 L12IN FNGR THK79MIL GRN LTX FREE

## (undated) DEVICE — DRAPE,U/SHT,SPLIT,FILM,60X84,STERILE: Brand: MEDLINE

## (undated) DEVICE — SHEET, ORTHO, SPLIT, STERILE: Brand: MEDLINE

## (undated) DEVICE — DRESSING,GAUZE,XEROFORM,CURAD,5"X9",ST: Brand: CURAD

## (undated) DEVICE — BNDG,ELSTC,MATRIX,STRL,6"X5YD,LF,HOOK&LP: Brand: MEDLINE

## (undated) DEVICE — 3M™ STERI-DRAPE™ U-DRAPE 1015: Brand: STERI-DRAPE™

## (undated) DEVICE — BANDAGE COMPR ELASTIC 4 IN STRL ACE

## (undated) DEVICE — SYRINGE IRRIG 60ML SFT PLIABLE BLB EZ TO GRP 1 HND USE W/

## (undated) DEVICE — PENCIL ES L3M BTTN SWCH HOLSTER W/ BLDE ELECTRD EDGE

## (undated) DEVICE — ELECTRODE PT RET AD L9FT HI MOIST COND ADH HYDRGEL CORDED

## (undated) DEVICE — GUIDEWIRE ORTHOPEDIC 0.8X100 MM TROCAR PT 1 END

## (undated) DEVICE — BANDAGE COBAN 6 IN WND 6INX5YD FOAM

## (undated) DEVICE — STOCKINETTE ORTH W9XL36IN COT 2 PLY HLLW FOR HANDLING LMB

## (undated) DEVICE — BANDAGE COBAN 4 IN COMPR W4INXL5YD FOAM COHESIVE QUIK STK SELF ADH SFT

## (undated) DEVICE — BNDG,ELSTC,MATRIX,STRL,3"X5YD,LF,HOOK&LP: Brand: MEDLINE

## (undated) DEVICE — SUTURE VICRYL SZ 0 L27IN ABSRB UD L36MM CP-1 1/2 CIR REV CUT J267H

## (undated) DEVICE — PADDING CAST W3INXL4YD COT BLEND MIC PLEAT UNDERCAST SPEC

## (undated) DEVICE — GUIDEWIRE ORTHO 1.1X150 MM TROCAR PT 1 END

## (undated) DEVICE — SUTURE MONOCRYL STRATAFIX SPRL + SZ 4-0 L12IN ABSRB UD PS-2 SXMP1B117

## (undated) DEVICE — SUTURE VICRYL SZ 3-0 L18IN ABSRB UD PS-2 L19MM 3/8 CRV PRIM J497H

## (undated) DEVICE — Z DISCONTINUED USE 2858462 SPLINT ORTH W4XL30IN LAYERED FBRGLS FOAM PD BRTH BK MOLD

## (undated) DEVICE — SOLUTION IRRIG 1000ML 0.9% SOD CHL USP POUR PLAS BTL